# Patient Record
(demographics unavailable — no encounter records)

---

## 2022-06-27 RX ORDER — TRAZODONE HYDROCHLORIDE 50 MG/1
TABLET ORAL
COMMUNITY
Start: 2022-01-17

## 2022-06-27 RX ORDER — ALBUTEROL SULFATE 90 UG/1
AEROSOL, METERED RESPIRATORY (INHALATION)
COMMUNITY
Start: 2022-01-17

## 2022-06-27 RX ORDER — ROSUVASTATIN CALCIUM 10 MG/1
TABLET, COATED ORAL
COMMUNITY
End: 2022-09-01 | Stop reason: SDUPTHER

## 2022-06-27 RX ORDER — LOSARTAN POTASSIUM AND HYDROCHLOROTHIAZIDE 12.5; 1 MG/1; MG/1
TABLET ORAL
COMMUNITY

## 2022-07-15 LAB
ALBUMIN SERPL-MCNC: 3.5 G/DL (ref 3.5–5.2)
ALBUMIN/GLOB SERPL: 2 {RATIO} (ref 1–2.7)
ALP SERPL-CCNC: 87 UNIT/L (ref 35–117)
ALT SERPL-CCNC: 6 UNIT/L (ref 0–35)
ANION GAP SERPL CALC-SCNC: 11 MMOL/L (ref 2–17)
ANISOCYTOSIS BLD QL SMEAR: ABNORMAL
APPEARANCE: CLEAR
AST SERPL-CCNC: <10 UNIT/L (ref 0–35)
BILIRUB SERPL-MCNC: 1.49 MG/DL (ref 0–1.2)
BILIRUBIN, URINE, POC: NEGATIVE
BLOOD URINE, POC: ABNORMAL
BUN SERPL-MCNC: 12 MG/DL (ref 8–23)
CALCIUM SERPL-MCNC: 7.8 MG/DL (ref 8.8–10.2)
CHLORIDE SERPL-SCNC: 106 MMOL/L (ref 98–107)
CREAT SERPL-MCNC: 0.4 MG/DL (ref 0.5–1)
DEPRECATED HCO3 PLAS-SCNC: 23 MMOL/L (ref 22–29)
EOSINOPHIL # BLD: 0.2 X10E3/MCL (ref 0–0.5)
EOSINOPHILS/LEUKOCYTES [PURE NUMBER FRACTION] IN BLOOD BY MANUAL COUNT: 1 % (ref 0–7)
ERYTHROCYTE [DISTWIDTH] IN BLOOD BY AUTOMATED COUNT: 13.4 % (ref 11–16)
GFR SERPL CREATININE-BSD FRML MDRD: 97 ML/MIN/1.73M²
GLOBULIN SER CALC-MCNC: 1.7 G/DL (ref 1.9–4.4)
GLUCOSE BLD-MCNC: 370 MG/DL (ref 65–110)
GLUCOSE SERPL-MCNC: 300 MG/DL (ref 70–99)
GLUCOSE URINE, POC: >=1000 MG/DL
HCT VFR BLD AUTO: 32.1 % (ref 34–47)
HGB BLD-MCNC: 9.9 G/DL (ref 11.5–15.7)
HYPOCHROMIA BLD QL SMEAR: ABNORMAL
KETONES, URINE, POC: NEGATIVE MG/DL
LEUKOCYTE EST, POC: ABNORMAL
LYMPHOCYTES # BLD MANUAL: 10.8 X10E3/MCL (ref 1–3.2)
LYMPHOCYTES/LEUKOCYTES [PURE NUMBER FRACTION] IN BLOOD BY MANUAL COUNT: 69 % (ref 15–45)
MCH RBC QN AUTO: 30.4 PG (ref 27–34.5)
MCHC RBC AUTO-ENTMCNC: 30.8 G/DL (ref 32–36)
MCV RBC AUTO: 98.5 FL (ref 81–99)
MONOCYTES # BLD: 0.5 X10E3/MCL (ref 0.3–1)
MONOCYTES NFR BLD MANUAL: 3 % (ref 4–12)
MORPHOLOGY BLD-IMP: ABNORMAL
NEUTROPHILS # BLD: 4.2 X10E3/MCL (ref 1.6–7.3)
NEUTROPHILS NFR BLD: 27 % (ref 42–74)
NITRATE, URINE POC: NEGATIVE
OSMOLALITY SERPL CALC.SUM OF ELEC: 290 MOSM/KG (ref 270–287)
PH, URINE, POC: 5.5 (ref 4.5–8)
PLATELET # BLD AUTO: 158 X10E3/MCL (ref 140–440)
PLATELET BLD QL SMEAR: ADEQUATE
PMV BLD AUTO: 9.2 FL (ref 7.2–13.2)
POTASSIUM SERPL-SCNC: 3 MMOL/L (ref 3.5–5.3)
PROT SERPL-MCNC: 5.2 G/DL (ref 6.4–8.3)
PROTEIN,URINE, POC: NEGATIVE
RBC # BLD AUTO: 3.26 X10E6/MCL (ref 3.6–5.2)
SODIUM SERPL-SCNC: 140 MMOL/L (ref 135–145)
SPECIFIC GRAVITY, URINE, POC: 1.01 (ref 1–1.03)
URINALYSIS COLOR, POC: YELLOW
UROBILIN U POC: 1 EU/DL
WBC # BLD AUTO: 15.6 X10E3/MCL (ref 3.8–10.6)

## 2022-09-07 PROBLEM — D72.829 LEUKOCYTOSIS: Status: ACTIVE | Noted: 2022-09-07

## 2022-09-07 PROBLEM — C85.90 LYMPHOMA (HCC): Status: ACTIVE | Noted: 2022-09-07

## 2022-09-07 PROBLEM — D58.9 HEMOLYTIC ANEMIA (HCC): Status: ACTIVE | Noted: 2022-09-07

## 2022-09-07 PROBLEM — E78.3 FAMILIAL HYPERCHYLOMICRONEMIA: Status: ACTIVE | Noted: 2022-09-07

## 2022-09-07 PROBLEM — E11.9 TYPE 2 DIABETES MELLITUS WITHOUT COMPLICATION (HCC): Status: ACTIVE | Noted: 2022-09-07

## 2022-09-07 PROBLEM — C91.10 CHRONIC LYMPHOCYTIC LEUKEMIA (HCC): Status: ACTIVE | Noted: 2022-09-07

## 2022-09-07 PROBLEM — F33.1 MODERATE EPISODE OF RECURRENT MAJOR DEPRESSIVE DISORDER (HCC): Status: ACTIVE | Noted: 2022-09-07

## 2022-09-07 PROBLEM — E78.2 MIXED HYPERLIPIDEMIA: Status: ACTIVE | Noted: 2022-09-07

## 2022-09-07 PROBLEM — F41.8 MIXED ANXIETY AND DEPRESSIVE DISORDER: Status: ACTIVE | Noted: 2022-09-07

## 2022-09-07 PROBLEM — F51.04 CHRONIC INSOMNIA: Status: ACTIVE | Noted: 2022-09-07

## 2022-09-07 PROBLEM — J44.9 CHRONIC OBSTRUCTIVE PULMONARY DISEASE (HCC): Status: ACTIVE | Noted: 2022-09-07

## 2022-09-07 PROBLEM — E83.52 HYPERCALCEMIA: Status: ACTIVE | Noted: 2022-09-07

## 2022-09-07 PROBLEM — R46.89 AGGRESSIVE BEHAVIOR: Status: ACTIVE | Noted: 2022-09-07

## 2022-09-07 PROBLEM — I10 BENIGN ESSENTIAL HYPERTENSION: Status: ACTIVE | Noted: 2022-09-07

## 2022-09-07 PROBLEM — M79.609 PAIN IN LIMB: Status: ACTIVE | Noted: 2022-09-07

## 2022-09-07 PROBLEM — E11.65 TYPE 2 DIABETES MELLITUS WITH HYPERGLYCEMIA, WITHOUT LONG-TERM CURRENT USE OF INSULIN (HCC): Status: ACTIVE | Noted: 2022-09-07

## 2022-09-07 PROBLEM — D64.9 ANEMIA: Status: ACTIVE | Noted: 2022-09-07

## 2022-09-07 PROBLEM — F41.9 ANXIETY: Status: ACTIVE | Noted: 2022-09-07

## 2022-09-07 PROBLEM — Z90.13 HISTORY OF BILATERAL MASTECTOMY: Status: ACTIVE | Noted: 2022-09-07

## 2022-09-07 PROBLEM — F03.90 DEMENTIA (HCC): Status: ACTIVE | Noted: 2022-09-07

## 2022-09-07 PROBLEM — R06.81 BREATHLESSNESS: Status: ACTIVE | Noted: 2022-09-07

## 2022-09-07 PROBLEM — E80.6 HYPERBILIRUBINEMIA: Status: ACTIVE | Noted: 2022-09-07

## 2022-09-07 PROBLEM — E11.9 TYPE 2 DIABETES MELLITUS WITHOUT COMPLICATION (HCC): Status: RESOLVED | Noted: 2022-09-07 | Resolved: 2022-09-07

## 2022-10-19 NOTE — ED NOTES
ED Patient Education Note     Patient Education Materials Follows:  Endocrinology     Hyperglycemia    Hyperglycemia is when the sugar (glucose) level in your blood is too high. High blood sugar can happen to people who do or do not have diabetes. High blood sugar can happen quickly. It can be an emergency. What are the causes? If you have diabetes, high blood sugar may be caused by:   Medicines that increase blood sugar or affect your diabetes control. Getting less physical activity. Eating more than planned. Being sick or injured. Having an infection. Having surgery. Stress. Not giving yourself enough insulin (if you are taking insulin). In some cases, high blood sugar may be caused by diabetes that has not been diagnosed yet. If you do not have diabetes, high blood sugar may be caused by:   Certain medicines. Stress. A bad illness. An infection. Having surgery. Diseases of the pancreas. What increases the risk? This condition is more likely to develop in people who have risk factors for diabetes, such as:   Having a family member with diabetes. Certain conditions in which the body's defense system (immune system) attacks itself. These are called autoimmune disorders. Being overweight or obese. Not being active. Having a condition called insulin resistance. Having a history of:   ? Prediabetes. ? Gestational diabetes. ? Polycystic ovarian syndrome (PCOS). What are the signs or symptoms? This condition may not cause symptoms. If you do have symptoms, they may include:   Feeling more thirsty than normal.     Needing to pee (urinate) more often than normal.     Hunger. Feeling very tired. Blurry eyesight (vision). You may get other symptoms as the condition gets worse, such as:   Dry mouth. Pain in your belly (abdomen). Not being hungry (loss of appetite).      Breath that smells fruity. Weakness. Weight loss that is not planned. A tingling or numb feeling in your hands or feet. A headache. Cuts or bruises that heal slowly. How is this treated? Treatment depends on the cause of your condition. Treatment may include:   Taking medicine to control your blood sugar levels. Changing your medicine or dosage if you take insulin or other diabetes medicines. Lifestyle changes. These may include:  ? Exercising more. ? Eating healthier foods. ? Losing weight. Treating an illness or infection. Checking your blood sugar more often. Stopping or reducing steroid medicines. If your condition gets very bad, you will need to be treated in the hospital.      Follow these instructions at home:    General instructions     Take over-the-counter and prescription medicines only as told by your doctor. Do not use any products that contain nicotine or tobacco. This includes cigarettes, e-cigarettes, and chewing tobacco. If you need help quitting, ask your doctor. If you drink alcohol:   ? Limit how much you use to:   ? 0?1 drink a day for women. ? 0?2 drinks a day for men. ? Be aware of how much alcohol is in your drink. In the [place] one drink equals one 12 oz bottle of beer (355 mL), one 5 oz glass of wine (148 mL), or one 1? oz glass of hard liquor (44 mL). Manage stress. If you need help with this, ask your doctor. Exercise often as told by your doctor. Keep all follow-up visits as told by your doctor. This is important. Eating and drinking       Stay at a healthy weight. Drink enough fluid to keep your pee (urine) pale yellow. Make sure you drink enough fluid when you:  ? Exercise. ? Get sick. ? Are in hot temperatures. Follow your meal plan. Eat on time. Do not skip meals. If you have diabetes:       Make sure you know the symptoms of high blood sugar.      Follow your diabetes management plan as told by your doctor. Make sure you:  ? Take insulin and medicines as told. ? Follow your exercise plan. ? Follow your meal plan. Eat on time. Do not skip meals. ? Check your blood sugar as often as told. Make sure you check before and after exercise. If you exercise longer or in a different way than usual, check your blood sugar more often. ? Follow your sick day plan whenever you cannot eat or drink normally. Make this plan ahead of time with your doctor. Share your diabetes management plan with people in your workplace, school, and household. Check your pee for ketones when you are ill and as told by your doctor. Carry a card or wear jewelry that says that you have diabetes. Contact a doctor if:     Your blood sugar level is at or above 240 mg/dL (13.3 mmol/L) for 2 days in a row. You have problems keeping your blood sugar in your target range. High blood sugar happens often for you. You have signs of illness, such as:  ? Feeling like you may vomit (nauseous). ? Vomiting. ? A fever. Get help right away if:     Your blood sugar monitor reads \"high\" even when you are taking insulin. You have trouble breathing. You have a change in how you think, feel, or act (mental status). You feel like you may vomit, and the feeling does not go away. You cannot stop vomiting. These symptoms may be an emergency. Do not wait to see if the symptoms will go away. Get medical help right away. Call your local emergency services (911 in the [place] Do not drive yourself to the hospital.      Summary     Hyperglycemia is when the sugar (glucose) level in your blood is too high. High blood sugar can happen to people who have or do not have diabetes. Make sure you drink enough fluids and follow your meal plan. Exercise often as told by your doctor. Contact your doctor if you have problems keeping your blood sugar in your target range.       This

## 2022-10-19 NOTE — DISCHARGE SUMMARY
ED Clinical Summary                         [facility]  [address], [place], [address] [phone]           PERSON INFORMATION  Name: Rosalind Leary Age:  80 Years : 1936   Sex: Female Language: English PCP: Yamila YUSUF   Marital Status:   Phone: [phone] Med Service: MED-Medicine   MRN:  [id number] Acct# [de-identified] Arrival: 7/15/2022 14:09:00   Visit Reason: Hyperglycemia - symptomatic; Hyperglycemia - symptomatic; EMS/HYPERGLYCEMIA Acuity: 3 LOS: 000 04:06   Address:      [address] [place] [address]  Diagnosis:      Hyperglycemia; Hypokalemia  Printed Prescriptions: Allergies      No Known Medication Allergies      Medications Administered During Visit:                  Medication Dose Route   Sodium Chloride 0.9% 1000 mL IV Piggyback   potassium chloride 40 mEq Oral       Patient Medication List:              losartan-hydrochlorothiazide (losartan-hydrochlorothiazide 100 mg-12.5 mg oral tablet) 1 Tabs Oral (given by mouth) every day. metFORMIN 1,000 Milligram Oral (given by mouth) 2 times a day. potassium chloride (potassium chloride 20 mEq oral tablet, extended release) 1 Tabs Oral (given by mouth) 2 times a day for 5 Days. Refills: 0.  rosuvastatin (rosuvastatin 10 mg oral tablet) 1 Tabs Oral (given by mouth) every day. Major Tests and Procedures: The following procedures and tests were performed during your ED visit. COMMONPROCEDURES%>  COMMON PROCEDURESCOMMENTS%>          Laboratory Orders  Name Status Details   . Glu POC Completed Blood, RT, RT - Routine, Collected, 07/15/22 14:26:01 EDT, Nurse collect, 07/15/22 14:26:01 [facility], [id number] POC Login   . UA POC Completed Urine, RT, RT - Routine, Collected, 07/15/22 17:16:00 EDT, Nurse collect, 07/15/22 17:16:00 /[facility], RAL POC Login   CBCDIFF Completed Blood, Stat, ST - Stat, 07/15/22 16:24:00 EDT, 07/15/22 16:24:00 EDT, Nurse OLIVERIO rosario NARINDER, Print label Y/N   CMP Completed Blood, Stat, ST - Stat, 07/15/22 16:24:00 EDT, 07/15/22 16:24:00 EDT, Nurse collect, NARINDER DURON, Print label Y/N   Diff Man Completed Blood, Stat, ST - Stat, 07/15/22 16:24:00 EDT, 07/15/22 16:24:00 EDT, Nurse collect, 07/15/22 17:15:00 [facility] Jarad Rod, [phone]               Radiology Orders  No radiology orders were placed.               Patient Care Orders  Name Status Details   Discharge Patient Ordered 07/15/22 17:59:00 EDT   ED Assessment Adult Completed 07/15/22 14:26:21 EDT, 07/15/22 14:26:21 EDT   ED Secondary Triage Completed 07/15/22 14:26:21 EDT, 07/15/22 14:26:21 EDT   ED Triage Adult Completed 07/15/22 14:09:18 EDT, 07/15/22 14:09:18 EDT   POC-Urine Dipstick collect Completed 07/15/22 16:24:00 EDT, Once, 07/15/22 16:24:00 EDT   Saline Lock Insert Completed 07/15/22 16:24:00 EDT, Once, 07/15/22 16:24:00 EDT             PROVIDER INFORMATION               Provider Role Assigned [facility] ED MidLevel 7/15/2022 16:14:59    Briana Rayo ED Nurse 7/15/2022 17:05:13        Attending Physician:  NARINDER Barrios     Consulting Doc       VITALS INFORMATION  Vital Sign Triage Latest   Temp Oral ORAL_1%>36.7 degC ORAL%>36.7 degC   Temp Temporal TEMPORAL_1%> TEMPORAL%>   Temp Intravascular INTRAVASCULAR_1%> INTRAVASCULAR%>   Temp Axillary AXILLARY_1%> AXILLARY%>   Temp Rectal RECTAL_1%> RECTAL%>   02 Sat 98 % 98 %   Respiratory Rate RATE_1%>15 br/min RATE%>16 br/min   Peripheral Pulse Rate PULSE RATE_1%>80 bpm PULSE RATE%>74 bpm   Apical Heart Rate HEART RATE_1%> HEART RATE%>   Blood Pressure BLOOD PRESSURE_1%>/ BLOOD PRESSURE_1%>93 mmHg BLOOD PRESSURE%>143 mmHg / BLOOD PRESSURE%>75 mmHg                 Immunizations      No Immunizations Documented This Visit          DISCHARGE INFORMATION   Discharge Disposition: H Outpt-Sent Home   Discharge Location:    Home   Discharge Date and Time: symptoms:  No fever, no chills. Respiratory symptoms:  No shortness of breath, no cough. Cardiovascular symptoms:  No chest pain, no palpitations, no syncope. Gastrointestinal symptoms:  No abdominal pain, no nausea, no vomiting. Genitourinary symptoms   Musculoskeletal symptoms:  No back pain,    Neurologic symptoms:  No headache, no dizziness. Additional review of systems information: All systems reviewed as documented in chart. Health Status   Allergies: Allergic Reactions (Selected)  No Known Medication Allergies. Medications:  (Selected)   Documented Medications  Documented  losartan-hydrochlorothiazide 100 mg-12.5 mg oral tablet: 1 tabs, Oral, Daily, 30 tabs, 0 Refill(s)  metFORMIN: 1,000 mg, Oral, BID, 0 Refill(s)  rosuvastatin 10 mg oral tablet: 10 mg, 1 tabs, Oral, Daily, 0 Refill(s). Past Medical/ Family/ Social History   Surgical history: Reviewed as documented in chart. Family history: Reviewed as documented in chart. Social history: Reviewed as documented in chart. Problem list:    Active Problems (5)  COPD (chronic obstructive pulmonary disease)   Dementia   Diabetes   Leukemia   Shortness of breath   , per nurse's notes. Physical Examination               Vital Signs   Vital Signs   9/15/2808 14:14 EDT Systolic Blood Pressure 837 mmHg  HI    Diastolic Blood Pressure 93 mmHg  HI    Temperature Oral 36.7 degC    Heart Rate Monitored 78 bpm    Respiratory Rate 15 br/min    SpO2 98 %   . Measurements   7/15/2022 14:26 EDT Body Mass Index est dom 20.60 kg/m2    Body Mass Index Measured 20.60 kg/m2   7/15/2022 14:23 EDT Height/Length Measured 161 cm    Weight Dosing 53.4 kg   . Oxygen saturation. General:  Alert, no acute distress. Skin:  Warm, dry. Head:  Normocephalic, atraumatic. Cardiovascular:  Regular rate and rhythm, No murmur, No edema.     Respiratory:  Lungs are clear to auscultation, respirations are non-labored, breath sounds are equal.    Gastrointestinal:  Soft, Nontender, Non distended, Normal bowel sounds. Neurological:  Alert and oriented to person, place, time, and situation, CN II-XII intact. Lymphatics:  No lymphadenopathy. Psychiatric:  Cooperative, appropriate mood & affect. Medical Decision Making   Differential Diagnosis[de-identified]  Diabetes, diabetic ketoacidosis, hyperglycemia, urinary tract infection. Rationale:  77-year-old female with history of diabetes, leukemia, COPD and dementia is brought in by EMS for evaluation of high blood sugars, asymptomatic, sent in by primary care. Patient was very agitated to be here, she did elope at 1 point after I evaluated her but her son was able to coax her into returning for evaluation. Labs do not have any evidence of DKA. She is little hypokalemic, potassium supplementation was given here, likely secondary to HCTZ, will provide 5 days of potassium pills. Otherwise chronic anemia, chronic leukocytosis due to leukemia. Rest patient feeling well on her baseline and ready to go home. All results were reviewed with patient and her son. Expressed understanding all questions answered., PA/NP reviewed with co-signing physician: diagnosis and plan of care. Documents reviewed:  Emergency department nurses' notes.    Results review:  Lab results : Lab View   7/15/2022 17:44 EDT Estimated Creatinine Clearance 86.50 mL/min   7/15/2022 17:16 EDT Appear U POC Clear    Color U POC Yellow    Bili U POC Negative    Blood U POC Trace    Glucose U POC >=1000 mg/dL    Ketones U POC Negative mg/dL    Leuk Est U POC Trace    Nitrite U POC Negative    pH U POC 5.5    Protein U POC Negative    Spec Grav U POC 1.015    Urobilin U POC 1.0 EU/dL   7/15/2022 17:15 EDT WBC 15.6 x10e3/mcL  HI    RBC 3.26 x10e6/mcL  LOW    Hgb 9.9 g/dL  LOW    HCT 32.1 %  LOW    MCV 98.5 fL    MCH 30.4 pg    MCHC 30.8 g/dL  LOW    RDW 13.4 %    Platelet 498 K41B2/HMY    MPV 9.2 fL    Sodium Lvl 140 mmol/L Potassium Lvl 3.0 mmol/L  CRIT    Chloride 106 mmol/L    CO2 23 mmol/L    Glucose Random 300 mg/dL  HI    BUN 12 mg/dL    Creatinine Lvl 0.4 mg/dL  LOW    AGAP 11 mmol/L    Osmolality Calc 290 mOsm/kg  HI    Calcium Lvl 7.8 mg/dL  LOW    Protein Total 5.2 g/dL  LOW    Albumin Lvl 3.5 g/dL    Globulin Calc 1.7 g/dL  LOW    AG Ratio Calc 2.00    Alk Phos 87 unit/L    AST <10 unit/L    ALT 6 unit/L    eGFR 97 mL/min/1.73mÂ²    Bili Total 1.49 mg/dL  HI   7/15/2022 14:26 EDT Estimated Creatinine Clearance 43.25 mL/min   7/15/2022 14:26 EDT Glucose .0 mg/dL  HI   7/14/2022 9:11 EDT Estimated Creatinine Clearance 40.83 mL/min   . Impression and Plan   Diagnosis   Hyperglycemia (ERC50-ZR R73.9, Discharge, Medical)   Hypokalemia (GPE72-CU E87.6, Discharge, Medical)   Plan   Condition: Stable. Disposition: Discharged: Time  7/15/2022 17:58:00, to home. Prescriptions: Launch prescriptions   Pharmacy:  potassium chloride 20 mEq oral tablet, extended release (Prescribe): 20 mEq, 1 tabs, Oral, BID, for 5 days, 10 tabs, 0 Refill(s). Patient was given the following educational materials: Hyperglycemia, Easy-to-Read, Hypokalemia. Follow up with: PCP or clinic Within 3 to 5 days Follow-up with primary care provider in 3-5 days f symptoms worsening or not improving. Return to ED if symptoms worsen. .    Counseled: Patient, Family, Regarding diagnosis, Regarding diagnostic results, Regarding treatment plan, Patient indicated understanding of instructions.

## 2022-10-19 NOTE — ED NOTES
ED Pre-Arrival Note        Pre-Arrival Summary    Name:  Sima Mayorga 2,    Current Date:  7/15/2022 14:10:08 EDT  Gender:  Female  Date of Birth:    Age:  80  Pre-Arrival Type:  EMS  ETA:  7/15/2022 14:28:00 EDT  Primary Care Physician:    Presenting Problem:  Hyperglycemia  Pre-Arrival User:  Марина Maldonado RN, Jim GEE  Referring Source:    Location:  [place]  BP:  134/72  HR:  77  O2:  98  Blood Sugar:  46            PreArrival Communication Form  Emergency Department        Additional Patient Information: hx DM, recent med change        Orders:  [    ] CBC                                            [     ] CT Head no contrast  [    ] BMP                                           [     ] CT Abdomen/Pelvis no contrast  [    ] PT/INR                                       [     ] CT Abdomen/Pelvis IV contrast, w/ oral contrast  [    ] Troponin                                   [     ] CT Abdomen/Pelvis IV contrast, no oral contrast  [    ] BNP                                            [     ] See ordersheet  [    ] CXR                                             [     ] Other:__________________________  [    ] EKG

## 2022-10-19 NOTE — ED NOTES
ED Triage Note       ED Triage Adult Entered On:  7/15/2022 14:26 EDT    Performed On:  7/15/2022 14:23 EDT by Felecia Gitelman, RN, Phoebe MCLAUGHLIN               Triage   Numeric Rating Pain Scale :   0 = No pain   Chief Complaint :   arrives ambulatory with EMS. denies complaints. states \"i'm fine until these ppl come into my house and check things\"  lives with daughter. denies hyperglycemic complaints.     [employer] Mode of Arrival :   Private vehicle   Infectious Disease Documentation :   Document assessment   Temperature Oral :   36.7 degC(Converted to: 98.1 degF)    Heart Rate Monitored :   78 bpm   Respiratory Rate :   15 br/min   Systolic Blood Pressure :   145 mmHg (HI)    Diastolic Blood Pressure :   93 mmHg (HI)    SpO2 :   98 %   Oxygen Therapy :   Room air   Patient presentation :   None of the above   Chief Complaint or Presentation suggest infection :   No   Dosing Weight Obtained By :   Measured   Weight Dosing :   53.4 kg(Converted to: 117 lb 12 oz)    Height :   161 cm(Converted to: 5 ft 3 in)    Body Mass Index Dosing :   21 kg/m2   Ying Harrell - 7/15/2022 14:23 EDT   DCP GENERIC CODE   Tracking Acuity :   3   Tracking Group :   ED [facility] Tracking [facility]   Ying Sharri - 7/15/2022 14:23 EDT   ED General Section :   Document assessment   Pregnancy Status :   N/A   ED Allergies Section :   Document assessment   ED Reason for Visit Section :   Document assessment   Ying Harrell - 7/15/2022 14:23 EDT   ID Risk Screen Symptoms   Recent Travel History :   No recent travel   TB Symptom Screen :   No symptoms   Last 90 days COVID-19 ID :   No   Close Contact with COVID-19 ID :   No   Last 14 days COVID-19 ID :   No   C. diff Symptom/History ID :   Neither of the above   Patient Pregnant :   None of the above   Felecia Gitelman, RN, Robert MCLAUGHLIN - 7/15/2022 14:23 EDT   Allergies   (As Of: 7/15/2022 14:26:20 EDT)   Allergies (Active)   No Known Medication Allergies  Estimated Onset Date: Unspecified ; Created By:   Ira Elizabeth RN, ANA GEE; Reaction Status:   Active ; Category:   Drug ; Substance:   No Known Medication Allergies ; Type: Allergy ; Updated By:   Tony GEE; Reviewed Date:   7/15/2022 14:24 EDT        Psycho-Social   Last 3 mo, thoughts killing self/others :   Patient denies   Right click within box for Suspected Abuse policy link. :   None   Feels Safe Where Live :   Yes   ED Behavioral Activity Rating Scale :   4 - Quiet and awake (normal level of activity)   Thuy Pinon RN, Iker Stewart - 7/15/2022 14:23 EDT   ED Reason for Visit   (As Of: 7/15/2022 14:26:20 EDT)   Problems(Active)    COPD (chronic obstructive pulmonary disease) (SNOMED CT  :45279608 )  Name of Problem:   COPD (chronic obstructive pulmonary disease) ; Recorder:   LIAN Nava Calista Paganini; Confirmation:   Confirmed ; Classification:   Patient Stated ; Code:   [id number] ; Contributor System:   [employer] ; Last Updated:   2/26/2022 12:52 EST ; Life Cycle Date:   2/26/2022 ; Life Cycle Status:   Active ; Vocabulary:   SNOMED CT        Dementia (SNOMED CT  :70722166 )  Name of Problem:   Dementia ; Recorder:   LIAN Nava Calista Paganini; Confirmation:   Confirmed ; Classification:   Patient Stated ; Code:   [id number] ; Contributor System:   [employer] ; Last Updated:   2/26/2022 12:56 EST ; Life Cycle Date:   2/26/2022 ; Life Cycle Status:   Active ; Vocabulary:   SNOMED CT        Diabetes (SNOMED CT  :126784949 )  Name of Problem:   Diabetes ; Recorder:   LIAN Nava Calista Paganini; Confirmation:   Confirmed ; Classification:   Patient Stated ; Code:   [id number] ; Contributor System:   PowerChart ; Last Updated:   2/26/2022 12:52 EST ; Life Cycle Date:   2/26/2022 ; Life Cycle Status:   Active ; Vocabulary:   SNOMED CT        Leukemia (SNOMED CT  :282807358 )  Name of Problem:   Leukemia ;  Recorder:   LIAN Nava Calista Paganini; Confirmation:   Confirmed ; Classification:   Patient Stated ; Code:   [id number] ; Contributor System:   Plan Me Up ; Last Updated:   2022 12:52 EST ; Life Cycle Date:   2022 ; Life Cycle Status:   Active ; Vocabulary:   SNOMED CT        Shortness of breath (IMO  :76768 )  Name of Problem:   Shortness of breath ; Recorder:   SYSTEM,  SYSTEM; Confirmation:   Confirmed ; Classification:   Patient Stated ; Code:   [id number] ; Last Updated:   2022 10:30 EST ;  Life Cycle Date:   2022 ; Life Cycle Status:   Active ; Vocabulary:   IMO          Diagnoses(Active)    Hyperglycemia - symptomatic  Date:   7/15/2022 ; Diagnosis Type:   Reason For Visit ; Confirmation:   Complaint of ; Clinical Dx:   Hyperglycemia - symptomatic ; Classification:   Medical ; Clinical Service:   Non-Specified ; Code:   PNED ; Probability:   0 ; Diagnosis Code:   [id number]

## 2022-10-19 NOTE — ED PROVIDER NOTES
Hyperglycemia - symptomatic        Patient:   Kris Pena             MRN: [id number]            FIN: [id number]               Age:   80 years     Sex:  Female     :  1936   Associated Diagnoses:   Hyperglycemia; Hypokalemia   Author:   Umang Pitt      Basic Information   Time seen: Provider Seen ()   ED Provider/Time:    NARINDER DURON / 07/15/2022 16:14  . Additional information: Chief Complaint from Nursing Triage Note   Chief Complaint  Chief Complaint: arrives ambulatory with EMS. denies complaints. states \"i'm fine until these ppl come into my house and check things\"  lives with daughter. denies hyperglycemic complaints.  (07/15/22 14:23:00). History of Present Illness   60-year-old female with history of diabetes, leukemia, COPD and dementia is brought in by EMS for evaluation of high blood sugars. They received a call phone call from primary care with concern that sugars were in the 400s. Patient is very agitated because she was forced to come here by EMS. She states she was just watching TV at home and feeling her normal self. Denies nausea or vomiting, abdominal pain, confusion, shakiness, chest pain, shortness of breath, fever. No changes to urination. She has been compliant with her medication. .        Review of Systems   Constitutional symptoms:  No fever, no chills. Respiratory symptoms:  No shortness of breath, no cough. Cardiovascular symptoms:  No chest pain, no palpitations, no syncope. Gastrointestinal symptoms:  No abdominal pain, no nausea, no vomiting. Genitourinary symptoms   Musculoskeletal symptoms:  No back pain,    Neurologic symptoms:  No headache, no dizziness. Additional review of systems information: All systems reviewed as documented in chart. Health Status   Allergies: Allergic Reactions (Selected)  No Known Medication Allergies.    Medications:  (Selected)   Documented very agitated to be here, she did elope at 1 point after I evaluated her but her son was able to coax her into returning for evaluation. Labs do not have any evidence of DKA. She is little hypokalemic, potassium supplementation was given here, likely secondary to HCTZ, will provide 5 days of potassium pills. Otherwise chronic anemia, chronic leukocytosis due to leukemia. Rest patient feeling well on her baseline and ready to go home. All results were reviewed with patient and her son. Expressed understanding all questions answered., PA/NP reviewed with co-signing physician: diagnosis and plan of care. Documents reviewed:  Emergency department nurses' notes.    Results review:  Lab results : Lab View   7/15/2022 17:44 EDT Estimated Creatinine Clearance 86.50 mL/min   7/15/2022 17:16 EDT Appear U POC Clear    Color U POC Yellow    Bili U POC Negative    Blood U POC Trace    Glucose U POC >=1000 mg/dL    Ketones U POC Negative mg/dL    Leuk Est U POC Trace    Nitrite U POC Negative    pH U POC 5.5    Protein U POC Negative    Spec Grav U POC 1.015    Urobilin U POC 1.0 EU/dL   7/15/2022 17:15 EDT WBC 15.6 x10e3/mcL  HI    RBC 3.26 x10e6/mcL  LOW    Hgb 9.9 g/dL  LOW    HCT 32.1 %  LOW    MCV 98.5 fL    MCH 30.4 pg    MCHC 30.8 g/dL  LOW    RDW 13.4 %    Platelet 557 Y21G7/IMY    MPV 9.2 fL    Sodium Lvl 140 mmol/L    Potassium Lvl 3.0 mmol/L  CRIT    Chloride 106 mmol/L    CO2 23 mmol/L    Glucose Random 300 mg/dL  HI    BUN 12 mg/dL    Creatinine Lvl 0.4 mg/dL  LOW    AGAP 11 mmol/L    Osmolality Calc 290 mOsm/kg  HI    Calcium Lvl 7.8 mg/dL  LOW    Protein Total 5.2 g/dL  LOW    Albumin Lvl 3.5 g/dL    Globulin Calc 1.7 g/dL  LOW    AG Ratio Calc 2.00    Alk Phos 87 unit/L    AST <10 unit/L    ALT 6 unit/L    eGFR 97 mL/min/1.73mÂ²    Bili Total 1.49 mg/dL  HI   7/15/2022 14:26 EDT Estimated Creatinine Clearance 43.25 mL/min   7/15/2022 14:26 EDT Glucose .0 mg/dL  HI   7/14/2022 9:11 EDT Estimated Creatinine Clearance 40.83 mL/min   . Impression and Plan   Diagnosis   Hyperglycemia (DZG87-AW R73.9, Discharge, Medical)   Hypokalemia (NBY13-HQ E87.6, Discharge, Medical)   Plan   Condition: Stable. Disposition: Discharged: Time  7/15/2022 17:58:00, to home. Prescriptions: Launch prescriptions   Pharmacy:  potassium chloride 20 mEq oral tablet, extended release (Prescribe): 20 mEq, 1 tabs, Oral, BID, for 5 days, 10 tabs, 0 Refill(s). Patient was given the following educational materials: Hyperglycemia, Easy-to-Read, Hypokalemia. Follow up with: PCP or clinic Within 3 to 5 days Follow-up with primary care provider in 3-5 days f symptoms worsening or not improving. Return to ED if symptoms worsen. .    Counseled: Patient, Family, Regarding diagnosis, Regarding diagnostic results, Regarding treatment plan, Patient indicated understanding of instructions.     Signature Line     Electronically Signed on 07/15/2022 06:02 PM EDT   ________________________________________________   Elsie Gates      Electronically Signed on 07/16/2022 01:59 AM EDT   ________________________________________________   Pilar LEAL            Modified by: Elsie Gates on 07/15/2022 05:59 PM EDT      Modified by: Elsie Gates on 07/15/2022 06:02 PM EDT

## 2022-10-19 NOTE — ED NOTES
ED Patient Summary       ;          [facility]  [address], [place], [address] [place] [address]  [phone]  Discharge Instructions (Patient)  Cynthea Saint  :  1936                   MRN: [id number]                   FIN: [id number]  Reason For Visit: Hyperglycemia - symptomatic; Hyperglycemia - symptomatic; EMS/HYPERGLYCEMIA  Final Diagnosis: Hyperglycemia; Hypokalemia     Visit Date: 7/15/2022 14:09:00  Address: [address] [place] [address] [address] [facility]  Phone: [phone]     Emergency Department Providers:         Primary Physician:      Jessica Wray would like to thank you for allowing us to assist you with your healthcare needs. The following includes patient education materials and information regarding your injury/illness. Follow-up Instructions: You were seen today on an emergency basis. Please contact your primary care doctor for a follow up appointment. If you received a referral to a specialist doctor, it is important you follow-up as instructed. It is important that you call your follow-up doctor to schedule and confirm the location of your next appointment. Your doctor may practice at multiple locations. The office location of your follow-up appointment may be different to the one written on your discharge instructions. If you do not have a primary care doctor, please call [phone] for help in finding a [facility] Provider. For help in finding a specialist doctor, please call [phone]. If your condition gets worse before your follow-up with your primary care doctor or specialist, please return to the Emergency Department. Coronavirus 2019 (COVID-19) Reminders:     Patients age 15 - 24, with parental consent, and over age 25 can make an appointment for a COVID-19 vaccine.  Patients can contact their [facility] doctors' offices to schedule an appointment to receive the COVID-19 vaccine. Patients who do not have a [facility] physician can call [phone] to schedule vaccination appointments. Follow Up Appointments:  Primary Care Provider:     Name: Meghna YUSUF     Phone: [phone]                 With: Address: When:   PCP or clinic  Within 3 to 5 days   Comments: Follow-up with primary care provider in 3-5 days f symptoms worsening or not improving. Return to ED if symptoms worsen. [address] SERVICES%>             New Medications  Printed Prescriptions  potassium chloride (potassium chloride 20 mEq oral tablet, extended release) 1 Tabs Oral (given by mouth) 2 times a day for 5 Days. Refills: 0. Last Dose:____________________  Medications that have not changed  Other Medications  losartan-hydrochlorothiazide (losartan-hydrochlorothiazide 100 mg-12.5 mg oral tablet) 1 Tabs Oral (given by mouth) every day. Last Dose:____________________  metFORMIN 1,000 Milligram Oral (given by mouth) 2 times a day. Last Dose:____________________  rosuvastatin (rosuvastatin 10 mg oral tablet) 1 Tabs Oral (given by mouth) every day. Last Dose:____________________      Allergy Info: No Known Medication Allergies     Discharge Additional Information          Discharge Patient 07/15/22 17:59:00 EDT      Patient Education Materials:        Hypokalemia    Hypokalemia means that the amount of potassium in the blood is lower than normal. Potassium is a chemical (electrolyte) that helps regulate the amount of fluid in the body. It also stimulates muscle tightening (contraction) and helps nerves work properly. Normally, most of the body's potassium is inside cells, and only a very small amount is in the blood. Because the amount in the blood is so small, minor changes to potassium levels in the blood can be life-threatening. What are the causes? This condition may be caused by: Antibiotic medicine.      Diarrhea or vomiting. Taking too much of a medicine that helps you have a bowel movement (laxative) can cause diarrhea and lead to hypokalemia. Chronic kidney disease (CKD). Medicines that help the body get rid of excess fluid (diuretics). Eating disorders, such as bulimia. Low magnesium levels in the body. Sweating a lot. What are the signs or symptoms? Symptoms of this condition include:   Weakness. Constipation. Fatigue. Muscle cramps. Mental confusion. Skipped heartbeats or irregular heartbeat (palpitations). Tingling or numbness. How is this diagnosed? This condition is diagnosed with a blood test.      How is this treated? This condition may be treated by:   Taking potassium supplements by mouth. Adjusting the medicines that you take. Eating more foods that contain a lot of potassium. If your potassium level is very low, you may need to get potassium through an IV and be monitored in the hospital.      Follow these instructions at home:       Take over-the-counter and prescription medicines only as told by your health care provider. This includes vitamins and supplements. Eat a healthy diet. A healthy diet includes fresh fruits and vegetables, whole grains, healthy fats, and lean proteins. If instructed, eat more foods that contain a lot of potassium. This includes:  ? Nuts, such as peanuts and pistachios. ? Seeds, such as sunflower seeds and pumpkin seeds. ? Peas, lentils, and lima beans. ? Whole grain and bran cereals and breads. ? Fresh fruits and vegetables, such as apricots, avocado, bananas, cantaloupe, kiwi, oranges, tomatoes, asparagus, and potatoes. ? Orange juice. ? Tomato juice. ? Red meats. ? Yogurt. Keep all follow-up visits as told by your health care provider. This is important. Contact a health care provider if you:     Have weakness that gets worse.      Feel your heart pounding or racing. Vomit. Have diarrhea. Have diabetes (diabetes mellitus) and you have trouble keeping your blood sugar (glucose) in your target range. Get help right away if you:     Have chest pain. Have shortness of breath. Have vomiting or diarrhea that lasts for more than 2 days. Faint. Summary     Hypokalemia means that the amount of potassium in the blood is lower than normal.     This condition is diagnosed with a blood test.     Hypokalemia may be treated by taking potassium supplements, adjusting the medicines that you take, or eating more foods that are high in potassium. If your potassium level is very low, you may need to get potassium through an IV and be monitored in the hospital.      This information is not intended to replace advice given to you by your health care provider. Make sure you discuss any questions you have with your health care provider. Document Revised: 07/31/2019 Document Reviewed: 07/31/2019  [employer] Patient Education ? [id number] [address].       Hyperglycemia    Hyperglycemia is when the sugar (glucose) level in your blood is too high. High blood sugar can happen to people who do or do not have diabetes. High blood sugar can happen quickly. It can be an emergency. What are the causes? If you have diabetes, high blood sugar may be caused by:   Medicines that increase blood sugar or affect your diabetes control. Getting less physical activity. Eating more than planned. Being sick or injured. Having an infection. Having surgery. Stress. Not giving yourself enough insulin (if you are taking insulin). In some cases, high blood sugar may be caused by diabetes that has not been diagnosed yet. If you do not have diabetes, high blood sugar may be caused by:   Certain medicines. Stress. A bad illness. An infection. Having surgery. Diseases of the pancreas. What increases the risk? This condition is more likely to develop in people who have risk factors for diabetes, such as:   Having a family member with diabetes. Certain conditions in which the body's defense system (immune system) attacks itself. These are called autoimmune disorders. Being overweight or obese. Not being active. Having a condition called insulin resistance. Having a history of:   ? Prediabetes. ? Gestational diabetes. ? Polycystic ovarian syndrome (PCOS). What are the signs or symptoms? This condition may not cause symptoms. If you do have symptoms, they may include:   Feeling more thirsty than normal.     Needing to pee (urinate) more often than normal.     Hunger. Feeling very tired. Blurry eyesight (vision). You may get other symptoms as the condition gets worse, such as:   Dry mouth. Pain in your belly (abdomen). Not being hungry (loss of appetite). Breath that smells fruity. Weakness. Weight loss that is not planned. A tingling or numb feeling in your hands or feet. A headache. Cuts or bruises that heal slowly. How is this treated? Treatment depends on the cause of your condition. Treatment may include:   Taking medicine to control your blood sugar levels. Changing your medicine or dosage if you take insulin or other diabetes medicines. Lifestyle changes. These may include:  ? Exercising more. ? Eating healthier foods. ? Losing weight. Treating an illness or infection. Checking your blood sugar more often. Stopping or reducing steroid medicines. If your condition gets very bad, you will need to be treated in the hospital.      Follow these instructions at home:    General instructions     Take over-the-counter and prescription medicines only as told by your doctor.      Do not use any products that contain nicotine or tobacco. This includes cigarettes, e-cigarettes, and chewing tobacco. If you need help quitting, ask your doctor. If you drink alcohol:   ? Limit how much you use to:   ? 0?1 drink a day for women. ? 0?2 drinks a day for men. ? Be aware of how much alcohol is in your drink. In the [place] one drink equals one 12 oz bottle of beer (355 mL), one 5 oz glass of wine (148 mL), or one 1? oz glass of hard liquor (44 mL). Manage stress. If you need help with this, ask your doctor. Exercise often as told by your doctor. Keep all follow-up visits as told by your doctor. This is important. Eating and drinking       Stay at a healthy weight. Drink enough fluid to keep your pee (urine) pale yellow. Make sure you drink enough fluid when you:  ? Exercise. ? Get sick. ? Are in hot temperatures. Follow your meal plan. Eat on time. Do not skip meals. If you have diabetes:       Make sure you know the symptoms of high blood sugar. Follow your diabetes management plan as told by your doctor. Make sure you:  ? Take insulin and medicines as told. ? Follow your exercise plan. ? Follow your meal plan. Eat on time. Do not skip meals. ? Check your blood sugar as often as told. Make sure you check before and after exercise. If you exercise longer or in a different way than usual, check your blood sugar more often. ? Follow your sick day plan whenever you cannot eat or drink normally. Make this plan ahead of time with your doctor. Share your diabetes management plan with people in your workplace, school, and household. Check your pee for ketones when you are ill and as told by your doctor. Carry a card or wear jewelry that says that you have diabetes. Contact a doctor if:     Your blood sugar level is at or above 240 mg/dL (13.3 mmol/L) for 2 days in a row. You have problems keeping your blood sugar in your target range. High blood sugar happens often for you.      You have signs of illness, such as:  ? Feeling like you may vomit (nauseous). ? Vomiting. ? A fever. Get help right away if:     Your blood sugar monitor reads \"high\" even when you are taking insulin. You have trouble breathing. You have a change in how you think, feel, or act (mental status). You feel like you may vomit, and the feeling does not go away. You cannot stop vomiting. These symptoms may be an emergency. Do not wait to see if the symptoms will go away. Get medical help right away. Call your local emergency services (911 in the [place] Do not drive yourself to the hospital.      Summary     Hyperglycemia is when the sugar (glucose) level in your blood is too high. High blood sugar can happen to people who have or do not have diabetes. Make sure you drink enough fluids and follow your meal plan. Exercise often as told by your doctor. Contact your doctor if you have problems keeping your blood sugar in your target range. This information is not intended to replace advice given to you by your health care provider. Make sure you discuss any questions you have with your health care provider. Document Revised: 12/17/2020 Document Reviewed: 11/25/2020  [employer] Patient Education ? [address] [place]      ---------------------------------------------------------------------------------------------------------------------  [facility] allows patients to review your COVID and other test results as well as discharge documents from any [facility], Emergency Department, surgical center or outpatient lab. Test results are typically available 36 hours after the test is completed. [address] encourages you to self-enroll in the [facility] Patient Portal.     To begin your self-enrollment process, please visit [email or web address] Under [facility], click on Sign up now.      NOTE: You must be 16 years and older to use [facility] Self-Enroll online. If you are a parent, caregiver, or guardian; you need an invite to access your childs or dependents health records. To obtain an invite, contact the Medical Records department at [phone] Monday through Friday, 8-4:30, select option 3 . If we receive your call afterhours, we will return your call the next business day. If you have issues trying to create or access your account, contact [employer] at [phone] available 7 days a week 24 hours a day.      Comment:

## 2022-10-19 NOTE — ED NOTES
ED Triage Note       ED Secondary Triage Entered On:  7/15/2022 17:17 EDT    Performed On:  7/15/2022 17:17 EDT by Deyanira Rasmussen               General Information   Barriers to Learning :   None evident   ED Home Meds Section :   Document assessment   [facility] ED Fall Risk Section :   Document assessment   ED Advance Directives Section :   Document assessment   ED Palliative Screen :   Document assessment   Deyanira Rasmussen - 7/15/2022 17:17 EDT   (As Of: 7/15/2022 17:17:56 EDT)   Problems(Active)    COPD (chronic obstructive pulmonary disease) (SNOMED CT  :35470383 )  Name of Problem:   COPD (chronic obstructive pulmonary disease) ; Recorder:   LIAN Nava Lawence Reese; Confirmation:   Confirmed ; Classification:   Patient Stated ; Code:   [id number] ; Contributor System:   PowerChart ; Last Updated:   2/26/2022 12:52 EST ; Life Cycle Date:   2/26/2022 ; Life Cycle Status:   Active ; Vocabulary:   SNOMED CT        Dementia (SNOMED CT  :55473118 )  Name of Problem:   Dementia ; Recorder:   LIAN Nava Lawence Reese; Confirmation:   Confirmed ; Classification:   Patient Stated ; Code:   16487672 ; Contributor System:   PowerChart ; Last Updated:   2/26/2022 12:56 EST ; Life Cycle Date:   2/26/2022 ; Life Cycle Status:   Active ; Vocabulary:   SNOMED CT        Diabetes (SNOMED CT  :801850826 )  Name of Problem:   Diabetes ; Recorder:   LIAN Nava Lawence Reese; Confirmation:   Confirmed ; Classification:   Patient Stated ; Code:   [id number] ; Contributor System:   PowerChart ; Last Updated:   2/26/2022 12:52 EST ; Life Cycle Date:   2/26/2022 ; Life Cycle Status:   Active ; Vocabulary:   SNOMED CT        Leukemia (SNOMED CT  :174392598 )  Name of Problem:   Leukemia ; Recorder:   LIAN Nava Lawence Reese; Confirmation:   Confirmed ; Classification:   Patient Stated ; Code:   [id number] ; Contributor System:   PowerChart ; Last Updated:   2/26/2022 12:52 EST ;  Life Cycle Date:   2/26/2022 ; Life Cycle Status:   Active ; Vocabulary:   SNOMED CT        Shortness of breath (IMO  :44777 )  Name of Problem:   Shortness of breath ; Recorder:   SYSTEM,  SYSTEM; Confirmation:   Confirmed ; Classification:   Patient Stated ; Code:   63094 ; Last Updated:   1/12/2022 10:30 EST ; Life Cycle Date:   1/12/2022 ; Life Cycle Status:   Active ; Vocabulary:   IMO          Diagnoses(Active)    Hyperglycemia - symptomatic  Date:   7/15/2022 ; Diagnosis Type:   Reason For Visit ; Confirmation:   Complaint of ; Clinical Dx:   Hyperglycemia - symptomatic ; Classification:   Medical ; Clinical Service:   Non-Specified ; Code:   PNED ; Probability:   0 ; Diagnosis Code:   [id number]             -    Procedure History   (As Of: 7/15/2022 17:17:56 EDT)     [facility] Fall Risk Assessment Tool   Hx of falling last 3 months ED Fall :   No   Andra Borjas - 7/15/2022 17:17 EDT   ED Advance Directive   Advance Directive :   No   Andra Borjas - 7/15/2022 17:17 EDT   Palliative Care   Does the Patient have a Life Limiting Illness :   None of the above   Andra Borjas - 7/15/2022 17:17 EDT   Med Hx   Medication List   (As Of: 7/15/2022 17:17:57 EDT)   Normal Order    Sodium Chloride 0.9% intravenous solution Bolus  :   Sodium Chloride 0.9% intravenous solution Bolus ; Status:   Completed ; Ordered As Mnemonic:   Sodium Chloride 0.9% bolus ; Simple Display Line:   1,000 mL, 2000 mL/hr, IV Piggyback, Once ; Ordering Provider:   Christian Shea; Catalog Code:   Sodium Chloride 0.9% ; Order Dt/Tm:   7/15/2022 16:24:32 EDT            Home Meds    losartan-hydrochlorothiazide  :   losartan-hydrochlorothiazide ; Status:   Documented ; Ordered As Mnemonic:   losartan-hydrochlorothiazide 100 mg-12.5 mg oral tablet ; Simple Display Line:   1 tabs, Oral, Daily, 30 tabs, 0 Refill(s) ;  Catalog Code:   losartan-hydrochlorothiazide ; Order Dt/Tm:   1/12/2022 11:21:38 EST          metFORMIN  :   metFORMIN ; Status: Documented ; Ordered As Mnemonic:   metFORMIN ; Simple Display Line:   1,000 mg, Oral, BID, 0 Refill(s) ; Catalog Code:   metFORMIN ; Order Dt/Tm:   1/12/2022 11:21:29 EST          rosuvastatin  :   rosuvastatin ; Status:   Documented ; Ordered As Mnemonic:   rosuvastatin 10 mg oral tablet ; Simple Display Line:   10 mg, 1 tabs, Oral, Daily, 0 Refill(s) ;  Catalog Code:   rosuvastatin ; Order Dt/Tm:   1/12/2022 11:21:53 EST

## 2022-10-29 NOTE — ED NOTES
ED Notes               Pt eloped from ED with son. pt eloped with IV intact. RN attempted to located pt and son prior to elopement but was unsuccessful. Phone call made to pt and message left to return phone call to ED. EMS Dispatch contacted to provide well check and remove IV.    Signature [employer]